# Patient Record
Sex: MALE | Race: WHITE | Employment: OTHER | URBAN - METROPOLITAN AREA
[De-identification: names, ages, dates, MRNs, and addresses within clinical notes are randomized per-mention and may not be internally consistent; named-entity substitution may affect disease eponyms.]

---

## 2019-11-21 ENCOUNTER — HOSPITAL ENCOUNTER (OUTPATIENT)
Dept: MRI IMAGING | Age: 74
Discharge: HOME | End: 2019-11-23
Payer: MEDICARE

## 2019-11-21 DIAGNOSIS — M54.16 LUMBAR RADICULOPATHY: ICD-10-CM

## 2019-11-21 DIAGNOSIS — M47.896 OTHER OSTEOARTHRITIS OF SPINE, LUMBAR REGION: ICD-10-CM

## 2019-11-21 DIAGNOSIS — M54.40 LOW BACK PAIN WITH SCIATICA, SCIATICA LATERALITY UNSPECIFIED, UNSPECIFIED BACK PAIN LATERALITY, UNSPECIFIED CHRONICITY: ICD-10-CM

## 2019-11-21 PROCEDURE — 72148 MRI LUMBAR SPINE W/O DYE: CPT

## 2019-12-16 ENCOUNTER — HOSPITAL ENCOUNTER (OUTPATIENT)
Dept: PHYSICAL THERAPY | Age: 74
Setting detail: THERAPIES SERIES
Discharge: HOME | End: 2019-12-16
Payer: MEDICARE

## 2019-12-16 PROCEDURE — 97161 PT EVAL LOW COMPLEX 20 MIN: CPT | Performed by: PHYSICAL THERAPIST

## 2019-12-16 ASSESSMENT — PAIN DESCRIPTION - ORIENTATION: ORIENTATION: LOWER

## 2019-12-16 ASSESSMENT — PAIN DESCRIPTION - PAIN TYPE: TYPE: ACUTE PAIN

## 2019-12-16 ASSESSMENT — PAIN DESCRIPTION - FREQUENCY: FREQUENCY: CONTINUOUS

## 2019-12-16 ASSESSMENT — PAIN DESCRIPTION - LOCATION: LOCATION: BACK

## 2019-12-18 ENCOUNTER — HOSPITAL ENCOUNTER (OUTPATIENT)
Dept: PHYSICAL THERAPY | Age: 74
Setting detail: THERAPIES SERIES
Discharge: HOME | End: 2019-12-18
Payer: MEDICARE

## 2019-12-18 PROCEDURE — 97113 AQUATIC THERAPY/EXERCISES: CPT

## 2019-12-27 ENCOUNTER — APPOINTMENT (OUTPATIENT)
Dept: GENERAL RADIOLOGY | Age: 74
End: 2019-12-27
Payer: MEDICARE

## 2019-12-27 ENCOUNTER — HOSPITAL ENCOUNTER (INPATIENT)
Age: 74
LOS: 1 days | Discharge: ANOTHER HOSPITAL | DRG: 084 | End: 2019-12-28
Attending: INTERNAL MEDICINE | Admitting: INTERNAL MEDICINE
Payer: MEDICARE

## 2019-12-27 ENCOUNTER — HOSPITAL ENCOUNTER (EMERGENCY)
Age: 74
Discharge: ANOTHER HOSPITAL | End: 2019-12-27
Attending: EMERGENCY MEDICINE
Payer: MEDICARE

## 2019-12-27 ENCOUNTER — APPOINTMENT (OUTPATIENT)
Dept: CT IMAGING | Age: 74
End: 2019-12-27
Payer: MEDICARE

## 2019-12-27 ENCOUNTER — HOSPITAL ENCOUNTER (OUTPATIENT)
Age: 74
Discharge: HOME | End: 2019-12-27
Payer: MEDICARE

## 2019-12-27 VITALS
TEMPERATURE: 97.8 F | WEIGHT: 220 LBS | BODY MASS INDEX: 29.16 KG/M2 | RESPIRATION RATE: 18 BRPM | HEART RATE: 64 BPM | HEIGHT: 73 IN | SYSTOLIC BLOOD PRESSURE: 144 MMHG | OXYGEN SATURATION: 95 % | DIASTOLIC BLOOD PRESSURE: 90 MMHG

## 2019-12-27 DIAGNOSIS — R06.02 SHORTNESS OF BREATH: ICD-10-CM

## 2019-12-27 DIAGNOSIS — R07.9 CHEST PAIN, UNSPECIFIED TYPE: ICD-10-CM

## 2019-12-27 DIAGNOSIS — S06.5XAA BILATERAL SUBDURAL HEMATOMAS: Primary | ICD-10-CM

## 2019-12-27 LAB
ANION GAP SERPL CALCULATED.3IONS-SCNC: 10 MMOL/L (ref 7–16)
APTT: 33.9 SEC (ref 24.5–35.1)
BASOPHILS ABSOLUTE: 0.04 E9/L (ref 0–0.2)
BASOPHILS RELATIVE PERCENT: 0.7 % (ref 0–2)
BUN BLDV-MCNC: 15 MG/DL (ref 8–23)
CALCIUM SERPL-MCNC: 8.9 MG/DL (ref 8.6–10.2)
CHLORIDE BLD-SCNC: 105 MMOL/L (ref 98–107)
CO2: 27 MMOL/L (ref 22–29)
CREAT SERPL-MCNC: 1.3 MG/DL (ref 0.7–1.2)
D DIMER: <200 NG/ML DDU
EKG ATRIAL RATE: 63 BPM
EKG P AXIS: 46 DEGREES
EKG P-R INTERVAL: 144 MS
EKG Q-T INTERVAL: 406 MS
EKG QRS DURATION: 92 MS
EKG QTC CALCULATION (BAZETT): 415 MS
EKG R AXIS: -17 DEGREES
EKG T AXIS: 2 DEGREES
EKG VENTRICULAR RATE: 63 BPM
EOSINOPHILS ABSOLUTE: 0.16 E9/L (ref 0.05–0.5)
EOSINOPHILS RELATIVE PERCENT: 2.8 % (ref 0–6)
GFR AFRICAN AMERICAN: >60
GFR NON-AFRICAN AMERICAN: 54 ML/MIN/1.73
GLUCOSE BLD-MCNC: 131 MG/DL (ref 74–99)
HCT VFR BLD CALC: 51.4 % (ref 37–54)
HEMOGLOBIN: 17.2 G/DL (ref 12.5–16.5)
IMMATURE GRANULOCYTES #: 0.04 E9/L
IMMATURE GRANULOCYTES %: 0.7 % (ref 0–5)
INR BLD: 1
LYMPHOCYTES ABSOLUTE: 0.9 E9/L (ref 1.5–4)
LYMPHOCYTES RELATIVE PERCENT: 15.7 % (ref 20–42)
MCH RBC QN AUTO: 31.9 PG (ref 26–35)
MCHC RBC AUTO-ENTMCNC: 33.5 % (ref 32–34.5)
MCV RBC AUTO: 95.2 FL (ref 80–99.9)
MONOCYTES ABSOLUTE: 0.46 E9/L (ref 0.1–0.95)
MONOCYTES RELATIVE PERCENT: 8 % (ref 2–12)
NEUTROPHILS ABSOLUTE: 4.14 E9/L (ref 1.8–7.3)
NEUTROPHILS RELATIVE PERCENT: 72.1 % (ref 43–80)
PDW BLD-RTO: 13.3 FL (ref 11.5–15)
PLATELET # BLD: 169 E9/L (ref 130–450)
PMV BLD AUTO: 8.7 FL (ref 7–12)
POTASSIUM REFLEX MAGNESIUM: 4.1 MMOL/L (ref 3.5–5)
PRO-BNP: 24 PG/ML (ref 0–450)
PROTHROMBIN TIME: 11.4 SEC (ref 9.3–12.4)
RBC # BLD: 5.4 E12/L (ref 3.8–5.8)
SODIUM BLD-SCNC: 142 MMOL/L (ref 132–146)
TROPONIN: <0.01 NG/ML (ref 0–0.03)
WBC # BLD: 5.7 E9/L (ref 4.5–11.5)

## 2019-12-27 PROCEDURE — 2580000003 HC RX 258: Performed by: PHYSICIAN ASSISTANT

## 2019-12-27 PROCEDURE — 85730 THROMBOPLASTIN TIME PARTIAL: CPT

## 2019-12-27 PROCEDURE — 85378 FIBRIN DEGRADE SEMIQUANT: CPT

## 2019-12-27 PROCEDURE — 80048 BASIC METABOLIC PNL TOTAL CA: CPT

## 2019-12-27 PROCEDURE — 96376 TX/PRO/DX INJ SAME DRUG ADON: CPT

## 2019-12-27 PROCEDURE — 84484 ASSAY OF TROPONIN QUANT: CPT

## 2019-12-27 PROCEDURE — G0378 HOSPITAL OBSERVATION PER HR: HCPCS

## 2019-12-27 PROCEDURE — 83880 ASSAY OF NATRIURETIC PEPTIDE: CPT

## 2019-12-27 PROCEDURE — APPSS15 APP SPLIT SHARED TIME 0-15 MINUTES: Performed by: NURSE PRACTITIONER

## 2019-12-27 PROCEDURE — 96374 THER/PROPH/DIAG INJ IV PUSH: CPT

## 2019-12-27 PROCEDURE — 70450 CT HEAD/BRAIN W/O DYE: CPT

## 2019-12-27 PROCEDURE — A0425 GROUND MILEAGE: HCPCS

## 2019-12-27 PROCEDURE — 2580000003 HC RX 258: Performed by: NURSE PRACTITIONER

## 2019-12-27 PROCEDURE — A0426 ALS 1: HCPCS

## 2019-12-27 PROCEDURE — 2500000003 HC RX 250 WO HCPCS: Performed by: NURSE PRACTITIONER

## 2019-12-27 PROCEDURE — 6360000002 HC RX W HCPCS: Performed by: NURSE PRACTITIONER

## 2019-12-27 PROCEDURE — 93005 ELECTROCARDIOGRAM TRACING: CPT | Performed by: STUDENT IN AN ORGANIZED HEALTH CARE EDUCATION/TRAINING PROGRAM

## 2019-12-27 PROCEDURE — 96375 TX/PRO/DX INJ NEW DRUG ADDON: CPT

## 2019-12-27 PROCEDURE — 85025 COMPLETE CBC W/AUTO DIFF WBC: CPT

## 2019-12-27 PROCEDURE — 2000000000 HC ICU R&B

## 2019-12-27 PROCEDURE — 85610 PROTHROMBIN TIME: CPT

## 2019-12-27 PROCEDURE — 6370000000 HC RX 637 (ALT 250 FOR IP): Performed by: PHYSICIAN ASSISTANT

## 2019-12-27 PROCEDURE — G0379 DIRECT REFER HOSPITAL OBSERV: HCPCS

## 2019-12-27 PROCEDURE — 71045 X-RAY EXAM CHEST 1 VIEW: CPT

## 2019-12-27 PROCEDURE — 87081 CULTURE SCREEN ONLY: CPT

## 2019-12-27 PROCEDURE — 6360000002 HC RX W HCPCS: Performed by: EMERGENCY MEDICINE

## 2019-12-27 PROCEDURE — 2580000003 HC RX 258: Performed by: STUDENT IN AN ORGANIZED HEALTH CARE EDUCATION/TRAINING PROGRAM

## 2019-12-27 PROCEDURE — 6360000002 HC RX W HCPCS: Performed by: STUDENT IN AN ORGANIZED HEALTH CARE EDUCATION/TRAINING PROGRAM

## 2019-12-27 PROCEDURE — 99291 CRITICAL CARE FIRST HOUR: CPT

## 2019-12-27 PROCEDURE — 6370000000 HC RX 637 (ALT 250 FOR IP): Performed by: NURSE PRACTITIONER

## 2019-12-27 RX ORDER — SODIUM CHLORIDE 9 MG/ML
INJECTION, SOLUTION INTRAVENOUS CONTINUOUS
Status: DISCONTINUED | OUTPATIENT
Start: 2019-12-27 | End: 2019-12-28 | Stop reason: HOSPADM

## 2019-12-27 RX ORDER — SODIUM CHLORIDE 0.9 % (FLUSH) 0.9 %
10 SYRINGE (ML) INJECTION PRN
Status: DISCONTINUED | OUTPATIENT
Start: 2019-12-27 | End: 2019-12-27

## 2019-12-27 RX ORDER — ACETAMINOPHEN 325 MG/1
650 TABLET ORAL EVERY 6 HOURS PRN
Status: DISCONTINUED | OUTPATIENT
Start: 2019-12-27 | End: 2019-12-27 | Stop reason: DRUGHIGH

## 2019-12-27 RX ORDER — DEXAMETHASONE SODIUM PHOSPHATE 10 MG/ML
10 INJECTION INTRAMUSCULAR; INTRAVENOUS ONCE
Status: COMPLETED | OUTPATIENT
Start: 2019-12-27 | End: 2019-12-27

## 2019-12-27 RX ORDER — ACETAMINOPHEN 325 MG/1
650 TABLET ORAL EVERY 4 HOURS PRN
Status: DISCONTINUED | OUTPATIENT
Start: 2019-12-27 | End: 2019-12-28 | Stop reason: HOSPADM

## 2019-12-27 RX ORDER — POTASSIUM CHLORIDE 20 MEQ/1
40 TABLET, EXTENDED RELEASE ORAL PRN
Status: DISCONTINUED | OUTPATIENT
Start: 2019-12-27 | End: 2019-12-28 | Stop reason: HOSPADM

## 2019-12-27 RX ORDER — ONDANSETRON 2 MG/ML
4 INJECTION INTRAMUSCULAR; INTRAVENOUS EVERY 6 HOURS PRN
Status: CANCELLED | OUTPATIENT
Start: 2019-12-27

## 2019-12-27 RX ORDER — TESTOSTERONE GEL, 1% 10 MG/G
GEL TRANSDERMAL 2 TIMES DAILY
Status: ON HOLD | COMMUNITY
End: 2019-12-27 | Stop reason: HOSPADM

## 2019-12-27 RX ORDER — ACETAMINOPHEN 325 MG/1
650 TABLET ORAL EVERY 4 HOURS PRN
Status: DISCONTINUED | OUTPATIENT
Start: 2019-12-27 | End: 2019-12-27

## 2019-12-27 RX ORDER — POTASSIUM CHLORIDE 20 MEQ/1
40 TABLET, EXTENDED RELEASE ORAL PRN
Status: CANCELLED | OUTPATIENT
Start: 2019-12-27

## 2019-12-27 RX ORDER — ONDANSETRON 2 MG/ML
4 INJECTION INTRAMUSCULAR; INTRAVENOUS EVERY 6 HOURS PRN
Status: DISCONTINUED | OUTPATIENT
Start: 2019-12-27 | End: 2019-12-27

## 2019-12-27 RX ORDER — 0.9 % SODIUM CHLORIDE 0.9 %
1000 INTRAVENOUS SOLUTION INTRAVENOUS ONCE
Status: COMPLETED | OUTPATIENT
Start: 2019-12-27 | End: 2019-12-27

## 2019-12-27 RX ORDER — POTASSIUM CHLORIDE 7.45 MG/ML
10 INJECTION INTRAVENOUS PRN
Status: DISCONTINUED | OUTPATIENT
Start: 2019-12-27 | End: 2019-12-28 | Stop reason: HOSPADM

## 2019-12-27 RX ORDER — SODIUM CHLORIDE 9 MG/ML
INJECTION, SOLUTION INTRAVENOUS CONTINUOUS
Status: CANCELLED | OUTPATIENT
Start: 2019-12-27

## 2019-12-27 RX ORDER — SODIUM CHLORIDE 0.9 % (FLUSH) 0.9 %
10 SYRINGE (ML) INJECTION EVERY 12 HOURS SCHEDULED
Status: DISCONTINUED | OUTPATIENT
Start: 2019-12-27 | End: 2019-12-27

## 2019-12-27 RX ORDER — ACETAMINOPHEN 325 MG/1
650 TABLET ORAL EVERY 4 HOURS PRN
Status: CANCELLED | OUTPATIENT
Start: 2019-12-27

## 2019-12-27 RX ORDER — LABETALOL HYDROCHLORIDE 5 MG/ML
10 INJECTION, SOLUTION INTRAVENOUS EVERY 10 MIN PRN
Status: DISCONTINUED | OUTPATIENT
Start: 2019-12-27 | End: 2019-12-28 | Stop reason: HOSPADM

## 2019-12-27 RX ORDER — LEVETIRACETAM 5 MG/ML
500 INJECTION INTRAVASCULAR ONCE
Status: DISCONTINUED | OUTPATIENT
Start: 2019-12-27 | End: 2019-12-27

## 2019-12-27 RX ORDER — HYDRALAZINE HYDROCHLORIDE 20 MG/ML
10 INJECTION INTRAMUSCULAR; INTRAVENOUS EVERY 10 MIN PRN
Status: DISCONTINUED | OUTPATIENT
Start: 2019-12-27 | End: 2019-12-28 | Stop reason: HOSPADM

## 2019-12-27 RX ORDER — LEVETIRACETAM 500 MG/1
500 TABLET ORAL 2 TIMES DAILY
Status: DISCONTINUED | OUTPATIENT
Start: 2019-12-27 | End: 2019-12-28 | Stop reason: HOSPADM

## 2019-12-27 RX ORDER — POTASSIUM CHLORIDE 7.45 MG/ML
10 INJECTION INTRAVENOUS PRN
Status: CANCELLED | OUTPATIENT
Start: 2019-12-27

## 2019-12-27 RX ORDER — SODIUM CHLORIDE 0.9 % (FLUSH) 0.9 %
10 SYRINGE (ML) INJECTION EVERY 12 HOURS SCHEDULED
Status: CANCELLED | OUTPATIENT
Start: 2019-12-27

## 2019-12-27 RX ORDER — SODIUM CHLORIDE 0.9 % (FLUSH) 0.9 %
10 SYRINGE (ML) INJECTION PRN
Status: CANCELLED | OUTPATIENT
Start: 2019-12-27

## 2019-12-27 RX ORDER — LEVETIRACETAM 10 MG/ML
1000 INJECTION INTRAVASCULAR ONCE
Status: COMPLETED | OUTPATIENT
Start: 2019-12-27 | End: 2019-12-27

## 2019-12-27 RX ORDER — LEVOTHYROXINE AND LIOTHYRONINE 38; 9 UG/1; UG/1
120 TABLET ORAL DAILY
Status: CANCELLED | OUTPATIENT
Start: 2019-12-27

## 2019-12-27 RX ORDER — LEVOTHYROXINE AND LIOTHYRONINE 19; 4.5 UG/1; UG/1
120 TABLET ORAL DAILY
Status: DISCONTINUED | OUTPATIENT
Start: 2019-12-28 | End: 2019-12-28 | Stop reason: HOSPADM

## 2019-12-27 RX ORDER — ONDANSETRON 2 MG/ML
4 INJECTION INTRAMUSCULAR; INTRAVENOUS EVERY 6 HOURS PRN
Status: DISCONTINUED | OUTPATIENT
Start: 2019-12-27 | End: 2019-12-28 | Stop reason: HOSPADM

## 2019-12-27 RX ORDER — SODIUM CHLORIDE 0.9 % (FLUSH) 0.9 %
10 SYRINGE (ML) INJECTION 2 TIMES DAILY
Status: DISCONTINUED | OUTPATIENT
Start: 2019-12-27 | End: 2019-12-28 | Stop reason: HOSPADM

## 2019-12-27 RX ORDER — CALCIUM CARBONATE 200(500)MG
500 TABLET,CHEWABLE ORAL 3 TIMES DAILY PRN
Status: DISCONTINUED | OUTPATIENT
Start: 2019-12-27 | End: 2019-12-28 | Stop reason: HOSPADM

## 2019-12-27 RX ADMIN — DEXAMETHASONE SODIUM PHOSPHATE 10 MG: 10 INJECTION INTRAMUSCULAR; INTRAVENOUS at 10:48

## 2019-12-27 RX ADMIN — HYDRALAZINE HYDROCHLORIDE 10 MG: 20 INJECTION INTRAMUSCULAR; INTRAVENOUS at 22:19

## 2019-12-27 RX ADMIN — LEVETIRACETAM 500 MG: 500 TABLET ORAL at 20:57

## 2019-12-27 RX ADMIN — LABETALOL HYDROCHLORIDE 10 MG: 5 INJECTION INTRAVENOUS at 23:02

## 2019-12-27 RX ADMIN — CALCIUM CARBONATE (ANTACID) CHEW TAB 500 MG 500 MG: 500 CHEW TAB at 20:58

## 2019-12-27 RX ADMIN — SODIUM CHLORIDE: 9 INJECTION, SOLUTION INTRAVENOUS at 21:10

## 2019-12-27 RX ADMIN — SODIUM CHLORIDE 1000 ML: 9 INJECTION, SOLUTION INTRAVENOUS at 09:20

## 2019-12-27 RX ADMIN — LEVETIRACETAM 1000 MG: 10 INJECTION INTRAVENOUS at 10:48

## 2019-12-27 RX ADMIN — SODIUM CHLORIDE, PRESERVATIVE FREE 10 ML: 5 INJECTION INTRAVENOUS at 20:58

## 2019-12-27 RX ADMIN — HYDRALAZINE HYDROCHLORIDE 10 MG: 20 INJECTION INTRAMUSCULAR; INTRAVENOUS at 16:51

## 2019-12-27 RX ADMIN — HYDRALAZINE HYDROCHLORIDE 10 MG: 20 INJECTION INTRAMUSCULAR; INTRAVENOUS at 21:10

## 2019-12-27 RX ADMIN — HYDRALAZINE HYDROCHLORIDE 10 MG: 20 INJECTION INTRAMUSCULAR; INTRAVENOUS at 21:35

## 2019-12-27 RX ADMIN — ONDANSETRON 4 MG: 2 INJECTION INTRAMUSCULAR; INTRAVENOUS at 23:33

## 2019-12-27 ASSESSMENT — ENCOUNTER SYMPTOMS
DIARRHEA: 0
HEMOPTYSIS: 0
VOICE CHANGE: 0
NAUSEA: 0
PHOTOPHOBIA: 0
VOMITING: 0
WHEEZING: 0
COUGH: 0
BACK PAIN: 0
SHORTNESS OF BREATH: 1
ABDOMINAL PAIN: 0
TROUBLE SWALLOWING: 0

## 2019-12-27 ASSESSMENT — PAIN DESCRIPTION - ORIENTATION: ORIENTATION: LEFT

## 2019-12-27 ASSESSMENT — PAIN DESCRIPTION - LOCATION: LOCATION: LEG

## 2019-12-27 ASSESSMENT — PAIN SCALES - GENERAL
PAINLEVEL_OUTOF10: 0
PAINLEVEL_OUTOF10: 6

## 2019-12-27 ASSESSMENT — PAIN DESCRIPTION - PAIN TYPE: TYPE: ACUTE PAIN

## 2019-12-27 NOTE — PROGRESS NOTES
Neuro Science Intensive Care Unit  Critical Care Consult 12/27/2019    Date of Admission: 12/27/2019    CC:  Follow up for SDH. HOSPITAL COURSE/OVERNIGHT EVENTS:  12/27 Presented to [facility] with complaints dizziness, back pain left foot weakness. 2 weeks ago had epidural injection for back pain. Hit head in October while watching grandson. Reported LOC. No history of antiplatelet or anticoagulants. PMH:   Past Medical History:   Diagnosis Date    Hypothyroidism      PSH:   Past Surgical History:   Procedure Laterality Date    KNEE SURGERY       Home Medications:   Prior to Admission medications    Medication Sig Start Date End Date Taking? Authorizing Provider   testosterone (ANDROGEL; TESTIM) 50 MG/5GM (1%) GEL 1% gel Apply topically 2 times daily.  To groin   Yes Historical Provider, MD   thyroid (ARMOUR) 120 MG tablet Take 120 mg by mouth daily    Historical Provider, MD   vitamin D3 (COLECALCIFEROL) 400 UNITS TABS Take by mouth daily    Historical Provider, MD     Allergies: No Known Allergies    Social History:  Social History     Socioeconomic History    Marital status:      Spouse name: Not on file    Number of children: Not on file    Years of education: Not on file    Highest education level: Not on file   Occupational History    Not on file   Social Needs    Financial resource strain: Not on file    Food insecurity:     Worry: Not on file     Inability: Not on file    Transportation needs:     Medical: Not on file     Non-medical: Not on file   Tobacco Use    Smoking status: Never Smoker    Smokeless tobacco: Never Used   Substance and Sexual Activity    Alcohol use: Not on file    Drug use: Not on file    Sexual activity: Not on file   Lifestyle    Physical activity:     Days per week: Not on file     Minutes per session: Not on file    Stress: Not on file   Relationships    Social connections:     Talks on phone: Not on file     Gets together: Not on file     Attends Sikhism service: Not on file     Active member of club or organization: Not on file     Attends meetings of clubs or organizations: Not on file     Relationship status: Not on file    Intimate partner violence:     Fear of current or ex partner: Not on file     Emotionally abused: Not on file     Physically abused: Not on file     Forced sexual activity: Not on file   Other Topics Concern    Not on file   Social History Narrative    Not on file     Family History: No family history on file. VITAL SIGNS:   There were no vitals taken for this visit. PHYSICAL EXAM:    General appearance:  Comfortable. Pain Description: none    NEUROLOGIC:    GCS:  15  4 - Opens eyes on own   6 - Follows simple motor commands  5 - Alert and oriented  Oriented X 4. Pupil size:  Left 3 mm    Right 3 mm  Pupil reaction: Yes   PERRLA  Wiggles fingers: Left Yes Right Yes  Hand grasp:   Left normal    Right normal  Wiggles toes: Left Yes    Right Yes  Plantar flexion: Left normal   Right normal    CONSTITUTIONAL: No acute distress, lying in hospital bed. CARDIOVASCULAR: S1 S2, regular rate, regular rhythm, no murmur/gallop/rub. Monitor:  NSR. PULMONARY: Bilateral clear. No rhonchi/rales/wheezes, no use of accessory muscles. Room air. RENAL:   Voids. ABDOMEN: Soft, nontender, nondistended, nontympanic, normal bowel sounds. Denies any nausea or vomiting. MUSCULOSKELETAL: Moves all extremities purposefully, 5/5 strength. SKIN/EXTREMITIES: No rashes/ecchymosis, no edema/clubbing, warm/dry, good capillary refill. Peripheral IVs.     ASSESSMENT & PLAN     Active Problems:    Subdural hematoma (HCC)  Resolved Problems:    * No resolved hospital problems. *    Neuro: Bilateral SDH. Monitor neuro status. Neurosurgery following. Keppra. No antiplatelet/anticoagulants. CV: No acute issues. Monitor hemodynamics. BP goal 160 mm Hg. PRN hydralzine & labetalol.     Pulm:  No acute issues. Monitor RR & SpO2. Encourage cough, SMI  & deep breathing. GI: No acute issues. Monitor bowel function. Diet:  General.     Zofran. Bowel regime. Renal:  No acute issues. Monitor BUN & Cr, electrolytes & replace as needed. Monitor I & O.      Voids. ID: No acute issues  Endocrine: No acute issues. Monitor BS. Goal elss than 180. MSK: No acute issues.     ROM. Turn & reposition. PT & OT when able. Monitor for skin breakdown. Heme: No acute issues. Monitor CBC. Bowel regime:  MOM. Pain control/Sedation: Tylenol. DVT prophylaxis: SCDs. No Lovenox/heparin due to SDH. GI prophylaxis:   Diet. Ancillary consults:   Neurosurgery. Medicine. Patient/Family update:  Questions answered. Support given. Code status:  Full code. Disposition:  Admitted to NSICU.       Electronically signed by Clarence Ren RN MSN APRN-NP [facility] NP  JEANNIE CCRN 12/27/2019 2:56 PM

## 2019-12-28 VITALS
TEMPERATURE: 98.1 F | SYSTOLIC BLOOD PRESSURE: 162 MMHG | HEIGHT: 73 IN | WEIGHT: 220 LBS | BODY MASS INDEX: 29.16 KG/M2 | HEART RATE: 100 BPM | RESPIRATION RATE: 24 BRPM | DIASTOLIC BLOOD PRESSURE: 91 MMHG | OXYGEN SATURATION: 92 %

## 2019-12-28 PROCEDURE — G0378 HOSPITAL OBSERVATION PER HR: HCPCS

## 2019-12-28 NOTE — PROGRESS NOTES
Cherelle Davenport, pt wife, to let her know ambulance crew is here to get pt, and he will be heading to [facility] now. Called Unit 4G to update them that pt is on the way.

## 2019-12-28 NOTE — PROGRESS NOTES
Nurse called to advise that the patient is complaining of a new headache and that he has been given 3 doses of hydralazine and a dose of labetalol since 2100. Patient requesting motrin for pain.  I advised the nurse that she can give tylenol for pain per orders, and that she should contact Dr Servando Gurrola and advise of new headache and hypertension

## 2019-12-28 NOTE — PROGRESS NOTES
Dr. Saldana Lyman notified of pt new onset headache, as well as elevated BP, via perfect serve. No new orders at this time. Will continue to monitor.

## 2019-12-28 NOTE — PROGRESS NOTES
Nurse to nurse report relayed via telephone to [facility]. All pertinent information given. Pt and family aware of bed, Unit 4G, room 7.

## 2019-12-28 NOTE — DISCHARGE INSTR - COC
Continuity of Care Form    Patient Name: Zacarias Logan   :  1945  MRN:  [id number]    Admit date:  2019  Discharge date:  2019    Code Status Order: Full Code   Advance Directives:   [facility] Documentation     Date/Time Healthcare Directive Type of Healthcare Directive Copy in [facility] [address] Agent's Name Healthcare Agent's Phone Number    19 1608  No, patient does not have an advance directive for healthcare treatment -- -- -- -- --          Admitting Physician:  Gissell Crane MD  PCP: Kemar Baldwin MD    Discharging Nurse: [id number] [facility] Unit/Room#: [facility] Unit Phone Number: [phone]    Emergency Contact:   Extended Emergency Contact Information  Primary Emergency Contact: Deidre Jarrett  Address: [address] [place] [address], [address] Phone: [phone]  Mobile Phone: [phone]  Relation: Spouse  Secondary Emergency Contact: [address] Phone: [phone]  Relation: Child    Past Surgical History:  Past Surgical History:   Procedure Laterality Date    KNEE SURGERY         Immunization History: There is no immunization history on file for this patient.     Active Problems:  Patient Active Problem List   Diagnosis Code    Primary osteoarthritis of right knee M17.11    Status post total right knee replacement Z96.651    Subdural hematoma ([address] S06.5X9A       Isolation/Infection:   Isolation          No Isolation        Patient Infection Status     None to display          Nurse Assessment:  Last Vital Signs: /88   Pulse 88   Temp 98.1 °F (36.7 °C) (Oral)   Resp 19   Ht 6' 1\" (1.854 m)   Wt 220 lb (99.8 kg)   SpO2 90%   BMI 29.03 kg/m²     Last documented pain score (0-10 scale): Pain Level: 0  Last Weight:   Wt Readings from Last 1 Encounters:   19 220 lb (99.8 kg)     Mental Status:  oriented and alert    IV Access:  - Peripheral IV - site  right forearm, insertion date: 12/27/2019    Nursing Mobility/ADLs:  Walking   Independent  Transfer  Independent  Bathing  Independent  Dressing  Independent  [address]  Med Delivery   whole    Wound Care Documentation and Therapy:        Elimination:  Continence:   · Bowel: Yes  · Bladder: Yes  Urinary Catheter: None   Colostomy/Ileostomy/Ileal Conduit: No       Date of Last BM: unknown      Intake/Output Summary (Last 24 hours) at 12/28/2019 0243  Last data filed at 12/28/2019 0200  Gross per 24 hour   Intake 400 ml   Output 1575 ml   Net -1175 ml     I/O last 3 completed shifts: In: 400 [P.O.:400]  Out: 750 [Urine:750]    Safety Concerns:     History of Falls (last 30 days)    Impairments/Disabilities:      None    Nutrition Therapy:  Current Nutrition Therapy:   - Oral Diet:  NPO    Routes of Feeding: None  Liquids: No Restrictions  Daily Fluid Restriction: no  Last Modified Barium Swallow with Video (Video Swallowing Test): not done    Treatments at the Time of Hospital Discharge:   Respiratory Treatments: none  Oxygen Therapy:  is not on home oxygen therapy.   Ventilator:    - No ventilator support    Rehab Therapies: none  Weight Bearing Status/Restrictions: No weight bearing restirctions  Other Medical Equipment (for information only, NOT a DME order):  none  Other Treatments: none    Patient's personal belongings (please select all that are sent with patient):  clothes, cell phone,     RN SIGNATURE:  Ulyess Pallas BSN, RN    CASE MANAGEMENT/SOCIAL WORK SECTION    Inpatient Status Date: ***    Readmission Risk Assessment Score:  Readmission Risk              Risk of Unplanned Readmission:        8           Discharging to Facility/ Agency   · Name:   · Address:  · Phone:  · Fax:    Dialysis Facility (if applicable)   · Name:  · Address:  · Dialysis Schedule:  · Phone:  · Fax:    / signature:

## 2019-12-28 NOTE — PROGRESS NOTES
Neurosurgery  Patient seen and evaluated  Counseled wife and patient extensively all questions answered  Have recommended bilateral morales holes  Son who is a general surgeon recommends transfer to [facility] for care  He is presently neuro intact and is likely  stable for transfer

## 2019-12-29 LAB — MRSA CULTURE ONLY: NORMAL

## 2020-02-25 NOTE — CONSULTS
[address]                  [address] [facility]  [facility]                                  CONSULTATION    PATIENT NAME: Agnes Ruth                  :        1945  MED REC NO:   [id number]                            ROOM:       [id number]  ACCOUNT NO:   [de-identified]                           ADMIT DATE: 2019  PROVIDER:     Erik Dawson MD    CONSULT DATE:  2019    REASON FOR CONSULTATION:  Bilateral subdural hematomas. HISTORY OF PRESENT ILLNESS:  This is a 51-year-old male who presents to  the emergency department complaining of lightheadedness, dizziness,  shortness of breath, and leg pain. Wife was at the bedside. He has  been experiencing these symptoms for the past several weeks. It has  been progressively worsening. He has a history of chronic back pain,  received steroid injections for his back. He is on steroids for his  chronic pain. His shortness of breath comes and goes. He has pain in  his left leg and feels like his leg is swelling up. The emergency room  note at 12:27 p.m. was reviewed in detail and will not be completely  reiterated here. PHYSICAL EXAMINATION:  GENERAL:  He is awake and alert. Wife is at the bedside. He follows  commands. HEENT:  Pupils were equally round and reactive. Extraocular movements  were full. NECK:  Supple. NEUROLOGIC:  He had some slight motor drift of the right upper  extremity. I counseled him greater than 50% of the encounter time as well as the  wife and reviewed the CAT scans and actually spoke with the son who is  the general surgeon and actually recommended that he be transferred to  [facility], so I agreed with this. DIAGNOSIS:  Bilateral chronic subdural hematomas, who is going to need  surgical correction. Per son's wish, he will be transferred to [facility].         Yadira Frazier MD    D: 2020 14:25:37       T: 2020 14:32:16     [email or web address]  Job#: [id number] Doc#: [id number]    CC:

## 2020-03-19 ENCOUNTER — HOSPITAL ENCOUNTER (OUTPATIENT)
Dept: CT IMAGING | Age: 75
Discharge: HOME | End: 2020-03-21
Payer: MEDICARE

## 2020-03-19 PROCEDURE — 70450 CT HEAD/BRAIN W/O DYE: CPT

## 2021-07-23 ENCOUNTER — HOSPITAL ENCOUNTER (OUTPATIENT)
Dept: CT IMAGING | Age: 76
Discharge: HOME | End: 2021-07-25
Payer: MEDICARE

## 2021-07-23 ENCOUNTER — HOSPITAL ENCOUNTER (OUTPATIENT)
Dept: GENERAL RADIOLOGY | Age: 76
Discharge: HOME | End: 2021-07-25
Payer: MEDICARE

## 2021-07-23 DIAGNOSIS — R51.9 NONINTRACTABLE HEADACHE, UNSPECIFIED CHRONICITY PATTERN, UNSPECIFIED HEADACHE TYPE: ICD-10-CM

## 2021-07-23 DIAGNOSIS — M25.562 LEFT KNEE PAIN, UNSPECIFIED CHRONICITY: ICD-10-CM

## 2021-07-23 PROCEDURE — 73564 X-RAY EXAM KNEE 4 OR MORE: CPT

## 2021-07-23 PROCEDURE — 70450 CT HEAD/BRAIN W/O DYE: CPT
